# Patient Record
Sex: MALE | Race: WHITE | NOT HISPANIC OR LATINO | URBAN - METROPOLITAN AREA
[De-identification: names, ages, dates, MRNs, and addresses within clinical notes are randomized per-mention and may not be internally consistent; named-entity substitution may affect disease eponyms.]

---

## 2021-07-23 ENCOUNTER — WEB ENCOUNTER (OUTPATIENT)
Dept: URBAN - METROPOLITAN AREA CLINIC 98 | Facility: CLINIC | Age: 71
End: 2021-07-23

## 2021-07-23 ENCOUNTER — OFFICE VISIT (OUTPATIENT)
Dept: URBAN - METROPOLITAN AREA CLINIC 98 | Facility: CLINIC | Age: 71
End: 2021-07-23
Payer: MEDICARE

## 2021-07-23 VITALS
DIASTOLIC BLOOD PRESSURE: 73 MMHG | SYSTOLIC BLOOD PRESSURE: 131 MMHG | TEMPERATURE: 97.3 F | WEIGHT: 204.8 LBS | HEIGHT: 72 IN | BODY MASS INDEX: 27.74 KG/M2 | HEART RATE: 73 BPM

## 2021-07-23 DIAGNOSIS — Z85.038 PERSONAL HISTORY OF COLON CANCER: ICD-10-CM

## 2021-07-23 DIAGNOSIS — K62.5 RECTAL BLEEDING: ICD-10-CM

## 2021-07-23 PROCEDURE — 99214 OFFICE O/P EST MOD 30 MIN: CPT | Performed by: INTERNAL MEDICINE

## 2021-07-23 RX ORDER — LEVOTHYROXINE SODIUM 0.14 MG/1
TAKE 1 TABLET (137 MCG) BY ORAL ROUTE ONCE DAILY TABLET ORAL 1
Qty: 0 | Refills: 0 | Status: ACTIVE | COMMUNITY
Start: 1900-01-01 | End: 1900-01-01

## 2021-07-23 NOTE — HPI-TODAY'S VISIT:
Last colon in 12/19 and was normal- hemorrhoids.  History of colon cancer Having some rectal bleeding, intermittent with straining Bright red and painless. No weight loss.  No CIBH

## 2022-08-12 ENCOUNTER — OFFICE VISIT (OUTPATIENT)
Dept: URBAN - METROPOLITAN AREA CLINIC 98 | Facility: CLINIC | Age: 72
End: 2022-08-12
Payer: MEDICARE

## 2022-08-12 VITALS
BODY MASS INDEX: 27.44 KG/M2 | SYSTOLIC BLOOD PRESSURE: 106 MMHG | HEIGHT: 72 IN | WEIGHT: 202.6 LBS | DIASTOLIC BLOOD PRESSURE: 63 MMHG | HEART RATE: 62 BPM | TEMPERATURE: 97 F

## 2022-08-12 DIAGNOSIS — Z85.038 PERSONAL HISTORY OF COLON CANCER: ICD-10-CM

## 2022-08-12 DIAGNOSIS — K62.5 RECTAL BLEEDING: ICD-10-CM

## 2022-08-12 PROCEDURE — 99214 OFFICE O/P EST MOD 30 MIN: CPT | Performed by: INTERNAL MEDICINE

## 2022-08-12 RX ORDER — LEVOTHYROXINE SODIUM 137 UG/1
1 TABLET IN THE MORNING ON AN EMPTY STOMACH TABLET ORAL ONCE A DAY
Status: ACTIVE | COMMUNITY

## 2022-08-12 RX ORDER — TAMSULOSIN HYDROCHLORIDE 0.4 MG/1
1 CAPSULE CAPSULE ORAL ONCE A DAY
Status: ACTIVE | COMMUNITY

## 2022-08-12 RX ORDER — SODIUM, POTASSIUM,MAG SULFATES 17.5-3.13G
17.5-13.3-1.6 GM/177ML SOLUTION, RECONSTITUTED, ORAL ORAL
Qty: 1 BOX | Refills: 0 | OUTPATIENT
Start: 2022-08-12 | End: 2022-08-13

## 2022-08-12 NOTE — HPI-TODAY'S VISIT:
OUTPATIENT VISIT NOTE    CHIEF COMPLAINT:   Chest Pain, Hypertension, Hyperlipidemia    HISTORY OF PRESENT ILLNESS:  Mr. Red is a 43 year old male patient here today after an emergency department visit 2 weeks ago.    Patient was seen in the Emergency Department 2 weeks ago for chest pain and shortness of breath. His workup was negative at the time and he was referred to our office.    Patient states that he has been having mid-sternal chest pain for 2 weeks and describes it as a dull, ache. He states that he currently has the chest pain in the office. He states that he typically notices the pain during \"strenuous exercise\". He reports associated shortness of breath, but states that is because of his asthma and he often wheezes with it. He denies any radiation of the pain. His pain is also worse with stretching of his arms and pushing on his chest wall. The discomfort can last for hours to 2 days. The symptoms do not appear to significantly encumbered his activity level. He did not report any radiation of the discomfort to his arms, neck or jaw. He did not report any associated diaphoresis or palpitations.    Patient smokes 1 pack of cigarettes per day, and has been doing that for 26 years.    Patient has a history of hypertension, but states that it has been well-controlled on Lisinopril and Amlodipine.    Patient's history of hyperlipidemia is questionable. He states that he has never been on medication for lipid control because he has never been \"authorized\" by the doctor to get his prescription. His lipid panels for the past 3 years have been very labile.    He denies any family history of cardiac disease in either his mother or father. His father has a history of COPD.    He admits to a relatively sedentary lifestyle. He is currently on disability for apparent bipolar disorder.    REVIEW OF SYSTEMS:  CONSTITUTIONAL:  Denies fever, chills, sweats, no major weight  Last colon about 3 years ago Doing well.  No CIBH- does have intermittent bleeding.  Doing metamucil for now.  History of colon cancer. changes or fatigue.  HEENT:  Denies blurry or double vision. Denies headaches.  RESPIRATORY:  As in HPI.  CARDIOVASCULAR:  As in HPI.  GASTROINTESTINAL:  Denies nausea, vomiting, diarrhea, or constipation. Denies heartburn, dysphagia, or abdominal pain. Denies tarry or bloody stools.  HEMATOLOGIC:  Denies easy bruising or bleeding.  MUSCULOSKELETAL:  Denies chronic joint swelling.  SKIN:  Denies rash.  NEUROLOGIC:  Denies dizziness, faintness, syncope, or weakness.  PSYCHIATRIC: No major depression or anxiety.    PAST MEDICAL HISTORY:  Past Medical History:   Diagnosis Date   • Blood clot associated with vein wall inflammation    • Chronic back pain     & neck   • Depression    • Disorder of bone and cartilage, unspecified 04/10/2012   • DVT of upper extremity (deep vein thrombosis) (CMS/[facility])     Left upper arm   • Elevated blood pressure reading without diagnosis of hypertension    • Essential (primary) hypertension    • High cholesterol    • Migraines    • Obesity, unspecified    • Unspecified hypothyroidism      ALLERGIES:   Allergen Reactions   • Chlorthalidone Other (See Comments)     Low potassium , headaches      Current Outpatient Prescriptions   Medication Sig Dispense Refill   • amLODIPine (NORVASC) 10 MG tablet Take 1 tablet by mouth daily. 30 tablet 2   • lisinopril (PRINIVIL,ZESTRIL) 40 MG tablet Take 1 tablet by mouth daily. 30 tablet 1   • acetaminophen (TYLENOL 8 HOUR) 650 MG CR tablet Take 1 tablet by mouth every 8 hours as needed for Pain. 30 tablet 0   • furosemide (LASIX) 20 MG tablet Take 1 tablet by mouth daily. 30 tablet 11   • levothyroxine (SYNTHROID, LEVOTHROID) 200 MCG tablet Take 1 tablet by mouth daily. 90 tablet 3   • nicotine (NICODERM CQ) 21 MG/24HR patch Place 1 patch onto the skin every 24 hours. 28 patch 0   • albuterol 108 (90 Base) MCG/ACT inhaler Inhale 2 puffs into the lungs every 4 hours as needed for Shortness of Breath or Wheezing. Always use spacer as instructed here 8.5 g  3   • atorvastatin (LIPITOR) 40 MG tablet Take 1 tablet by mouth daily. 30 tablet 11     No current facility-administered medications for this visit.        I have reviewed the past medical history, family history, social history, medications and allergies listed in the medical record as obtained by my nursing staff and support staff and agree with their documentation.    PHYSICAL EXAMINATION:  VITAL SIGNS:    Visit Vitals  /52 (BP Location: [facility], Patient Position: Sitting, Cuff Size: Regular)   Pulse 64 Comment: Apical and regular   Resp 16   Ht 5' 11\" (1.803 m) Comment: pt. reported   Wt 130.6 kg   SpO2 98% Comment: Room Air   BMI 40.16 kg/m²     GENERAL: Patient is alert and appropriate, oriented, in no acute distress.  Mood and affect are appropriate. Obese male  HEENT: Pupils are equal and reactive to light and accommodation.  Conjunctivae are clear. Sclerae are anicteric. Extraocular movements are intact.  NECK: Carotids reveal no bruit or JVD (jugular venous distention). No thyromegaly or masses are noted. Trachea is midline.  LUNGS: Lung sounds clear to auscultation throughout. Respirations even and nonlabored at rest. No rales, rhonchi, or wheezes noted. The expiratory phase is mildly prolonged.  HEART: Regular rate and rhythm, normal S1 and S2, no obvious murmurs, gallops or rubs. Reproducible chest wall tenderness. No S3 or S4 was noted.  ABDOMEN: Soft and nontender. No hepatosplenomegaly or masses were noted. No guarding, rebound or rigidity were identified.  EXTREMITIES:  Warm with no obvious clubbing or cyanosis. No peripheral edema noted. Muscle strength equal in all extremities.  SKIN: Color is pink, warm to touch, turgor is good. No obvious rashes were noted.  NEUROPSYCHIATRIC:  Alert and appropriate and without gross cranial or motor deficits. Sensory exam did not reveal any gross abnormalities. Recent and remote memory intact. Oriented to person, place and time.    LABORATORY DATA DIAGNOSTIC  DATA:  Lab Results   Component Value Date    SODIUM 139 03/06/2018    POTASSIUM 3.7 03/06/2018    MG 1.6 (L) 03/06/2018    BUN 12 03/06/2018    CREATININE 0.84 03/06/2018       Lab Results   Component Value Date    WBC 7.5 03/06/2018    HCT 41.2 03/06/2018    HGB 14.7 03/06/2018     (L) 03/06/2018    INR 1.1 03/06/2018       Lab Results   Component Value Date    BNP 9 03/06/2018    RAPDTR <0.02 03/06/2018       Lab Results   Component Value Date    GLUCOSE 260 (H) 03/06/2018    HGBA1C 6.4 (H) 09/18/2017    TSH 0.108 (L) 02/19/2018    CHOLESTEROL 136 06/06/2017    HDL 37 (L) 06/06/2017    CALCLDL 62 06/06/2017    TRIGLYCERIDE 183 (H) 06/06/2017       EKG from March 21, 2018:  Sinus Bradycardia, Possible Q wave in leads II, III and AVF. The study was reviewed and independently interpreted. The findings were discussed with the patient.    EKG from March 11, 2018:  Normal sinus rhythm, Poor R wave progression     IMPRESSION AND PLAN:  1. Chest pain - his symptoms have some atypical features but he does have the presence of risk factors, possible EKG changes in the inferior leads and current symptoms. For this reason, will order stress echo for further evaluation. He is agreeable.    2. Hypertension - Well-controlled on Lisinopril and Amlodipine. Will continue with current therapy. Progressive weight loss would be very beneficial.    3. Hyperlipidemia - It is unclear about whether or not the patient has a history of hyperlipidemia. He has not been taking his Lipitor he has been prescribed. His lipid levels have been labile for the past 3 years. He is unsure if he fasted for them. Patient's most recent LDL was 62. Will reevaluate need for treatment after stress test. He remains somewhat puzzling regarding the variability of his LDL values but he admits that his dietary habits have been quite poor and this may partially explain his abnormalities. An adjustment in his dietary regimen has been discussed.    4.  Abnormal EKG-the EKG findings were reviewed with the patient. Inferior Q waves were noted but he has no previous history of a myocardial infarction. His inferior wall will be evaluated at the time of his stress echocardiogram.    FOLLOW UP:  After Stress Echo    The assesment and plan were reviewed and discussed in detail with the patient. All questions answered. Patient understands and agrees with plan.    On 3/21/2018, Vickey FISCHER PA-S, scribed the services personally performed by Carlos Leonard MD    Electronically signed by DANIEL Garcia Peter S. Diamond, MD have seen the patient with DANIEL Garcia.  I agree with his note above. I have reviewed the history and physical exam findings.  I have reviewed the pertinent lab and radiology data. I formulated the plan of treatment and follow up for the patient. I have discussed all of this with the patient and answered questions.    The documentation recorded by the scribe accurately and completely reflects the service(s) I personally performed and the decisions made by me.

## 2022-08-13 PROBLEM — 429699009: Status: ACTIVE | Noted: 2021-07-23

## 2022-10-25 ENCOUNTER — OFFICE VISIT (OUTPATIENT)
Dept: URBAN - METROPOLITAN AREA SURGERY CENTER 18 | Facility: SURGERY CENTER | Age: 72
End: 2022-10-25
Payer: MEDICARE

## 2022-10-25 DIAGNOSIS — Z85.038 H/O COLON CANCER, STAGE I: ICD-10-CM

## 2022-10-25 PROCEDURE — G8907 PT DOC NO EVENTS ON DISCHARG: HCPCS | Performed by: INTERNAL MEDICINE

## 2022-10-25 PROCEDURE — G0105 COLORECTAL SCRN; HI RISK IND: HCPCS | Performed by: INTERNAL MEDICINE

## 2022-10-25 RX ORDER — TAMSULOSIN HYDROCHLORIDE 0.4 MG/1
1 CAPSULE CAPSULE ORAL ONCE A DAY
Status: ACTIVE | COMMUNITY

## 2022-10-25 RX ORDER — LEVOTHYROXINE SODIUM 137 UG/1
1 TABLET IN THE MORNING ON AN EMPTY STOMACH TABLET ORAL ONCE A DAY
Status: ACTIVE | COMMUNITY

## 2023-08-31 ENCOUNTER — OFFICE VISIT (OUTPATIENT)
Dept: URBAN - METROPOLITAN AREA CLINIC 98 | Facility: CLINIC | Age: 73
End: 2023-08-31
Payer: MEDICARE

## 2023-08-31 ENCOUNTER — DASHBOARD ENCOUNTERS (OUTPATIENT)
Age: 73
End: 2023-08-31

## 2023-08-31 ENCOUNTER — TELEPHONE ENCOUNTER (OUTPATIENT)
Dept: URBAN - METROPOLITAN AREA CLINIC 97 | Facility: CLINIC | Age: 73
End: 2023-08-31

## 2023-08-31 VITALS
WEIGHT: 234 LBS | SYSTOLIC BLOOD PRESSURE: 128 MMHG | TEMPERATURE: 98.2 F | HEART RATE: 55 BPM | HEIGHT: 72 IN | BODY MASS INDEX: 31.69 KG/M2 | DIASTOLIC BLOOD PRESSURE: 68 MMHG

## 2023-08-31 DIAGNOSIS — Z85.038 PERSONAL HISTORY OF COLON CANCER: ICD-10-CM

## 2023-08-31 DIAGNOSIS — K62.5 RECTAL BLEEDING: ICD-10-CM

## 2023-08-31 PROCEDURE — 99214 OFFICE O/P EST MOD 30 MIN: CPT | Performed by: INTERNAL MEDICINE

## 2023-08-31 RX ORDER — LEVOTHYROXINE SODIUM 137 UG/1
1 TABLET IN THE MORNING ON AN EMPTY STOMACH TABLET ORAL ONCE A DAY
Status: ACTIVE | COMMUNITY

## 2023-08-31 RX ORDER — TAMSULOSIN HYDROCHLORIDE 0.4 MG/1
1 CAPSULE CAPSULE ORAL ONCE A DAY
Status: ACTIVE | COMMUNITY

## 2023-08-31 NOTE — HPI-TODAY'S VISIT:
Had prostate cancer and had XRT Colon cancer and had a colon last year Bright red blood with each BM No diarrhea.  Tried fiber and did not help.

## 2023-09-06 ENCOUNTER — TELEPHONE ENCOUNTER (OUTPATIENT)
Dept: URBAN - METROPOLITAN AREA CLINIC 98 | Facility: CLINIC | Age: 73
End: 2023-09-06

## 2023-11-22 ENCOUNTER — OFFICE VISIT (OUTPATIENT)
Dept: URBAN - METROPOLITAN AREA MEDICAL CENTER 28 | Facility: MEDICAL CENTER | Age: 73
End: 2023-11-22
Payer: MEDICARE

## 2023-11-22 DIAGNOSIS — K55.21 ACQUIRED ARTERIOVENOUS MALFORMATION OF SMALL INTESTINE WITH HEMORRHAGE: ICD-10-CM

## 2023-11-22 DIAGNOSIS — K92.1 ACUTE MELENA: ICD-10-CM

## 2023-11-22 PROCEDURE — 45334 SIGMOIDOSCOPY FOR BLEEDING: CPT | Performed by: INTERNAL MEDICINE

## 2023-11-22 RX ORDER — TAMSULOSIN HYDROCHLORIDE 0.4 MG/1
1 CAPSULE CAPSULE ORAL ONCE A DAY
Status: ACTIVE | COMMUNITY

## 2023-11-22 RX ORDER — LEVOTHYROXINE SODIUM 137 UG/1
1 TABLET IN THE MORNING ON AN EMPTY STOMACH TABLET ORAL ONCE A DAY
Status: ACTIVE | COMMUNITY

## 2024-07-15 NOTE — PHYSICAL EXAM CARDIOVASCULAR:
Problem: Fall Injury Risk  Goal: Absence of Fall and Fall-Related Injury  Outcome: Progressing  Intervention: Identify and Manage Contributors  Flowsheets (Taken 7/15/2024 1242)  Self-Care Promotion: independence encouraged  Medication Review/Management: medications reviewed  Intervention: Promote Injury-Free Environment  Flowsheets (Taken 7/15/2024 1242)  Safety Promotion/Fall Prevention: medications reviewed      no edema,  no murmurs,  regular rate and rhythm